# Patient Record
Sex: MALE | Race: BLACK OR AFRICAN AMERICAN | NOT HISPANIC OR LATINO | URBAN - METROPOLITAN AREA
[De-identification: names, ages, dates, MRNs, and addresses within clinical notes are randomized per-mention and may not be internally consistent; named-entity substitution may affect disease eponyms.]

---

## 2018-07-09 ENCOUNTER — EMERGENCY (EMERGENCY)
Facility: HOSPITAL | Age: 58
LOS: 0 days | Discharge: ANOTHER HOSPITAL | End: 2018-07-10
Attending: EMERGENCY MEDICINE
Payer: COMMERCIAL

## 2018-07-09 VITALS
TEMPERATURE: 99 F | RESPIRATION RATE: 18 BRPM | DIASTOLIC BLOOD PRESSURE: 82 MMHG | HEART RATE: 88 BPM | OXYGEN SATURATION: 99 % | HEIGHT: 73 IN | SYSTOLIC BLOOD PRESSURE: 137 MMHG

## 2018-07-09 DIAGNOSIS — M25.571 PAIN IN RIGHT ANKLE AND JOINTS OF RIGHT FOOT: ICD-10-CM

## 2018-07-09 DIAGNOSIS — I10 ESSENTIAL (PRIMARY) HYPERTENSION: ICD-10-CM

## 2018-07-09 DIAGNOSIS — Z98.890 OTHER SPECIFIED POSTPROCEDURAL STATES: ICD-10-CM

## 2018-07-09 PROCEDURE — 99285 EMERGENCY DEPT VISIT HI MDM: CPT

## 2018-07-09 NOTE — ED ADULT TRIAGE NOTE - CHIEF COMPLAINT QUOTE
pt complaining of right foot pain, swelling and drainage since this morning. pt status post right ankle fracture. pt has had a splint on his right ankle for 14 days.

## 2018-07-10 VITALS
SYSTOLIC BLOOD PRESSURE: 122 MMHG | DIASTOLIC BLOOD PRESSURE: 83 MMHG | RESPIRATION RATE: 18 BRPM | TEMPERATURE: 98 F | OXYGEN SATURATION: 98 % | HEART RATE: 98 BPM

## 2018-07-10 LAB
ALBUMIN SERPL ELPH-MCNC: 3.2 G/DL — LOW (ref 3.3–5)
ALP SERPL-CCNC: 51 U/L — SIGNIFICANT CHANGE UP (ref 40–120)
ALT FLD-CCNC: 21 U/L — SIGNIFICANT CHANGE UP (ref 12–78)
ANION GAP SERPL CALC-SCNC: 9 MMOL/L — SIGNIFICANT CHANGE UP (ref 5–17)
AST SERPL-CCNC: 19 U/L — SIGNIFICANT CHANGE UP (ref 15–37)
BASOPHILS # BLD AUTO: 0.03 K/UL — SIGNIFICANT CHANGE UP (ref 0–0.2)
BASOPHILS NFR BLD AUTO: 0.5 % — SIGNIFICANT CHANGE UP (ref 0–2)
BILIRUB SERPL-MCNC: 0.5 MG/DL — SIGNIFICANT CHANGE UP (ref 0.2–1.2)
BUN SERPL-MCNC: 17 MG/DL — SIGNIFICANT CHANGE UP (ref 7–23)
CALCIUM SERPL-MCNC: 9.7 MG/DL — SIGNIFICANT CHANGE UP (ref 8.5–10.1)
CHLORIDE SERPL-SCNC: 102 MMOL/L — SIGNIFICANT CHANGE UP (ref 96–108)
CO2 SERPL-SCNC: 26 MMOL/L — SIGNIFICANT CHANGE UP (ref 22–31)
CREAT SERPL-MCNC: 1.31 MG/DL — HIGH (ref 0.5–1.3)
CRP SERPL-MCNC: 6.08 MG/DL — HIGH (ref 0–0.4)
EOSINOPHIL # BLD AUTO: 0.08 K/UL — SIGNIFICANT CHANGE UP (ref 0–0.5)
EOSINOPHIL NFR BLD AUTO: 1.4 % — SIGNIFICANT CHANGE UP (ref 0–6)
ERYTHROCYTE [SEDIMENTATION RATE] IN BLOOD: 81 MM/HR — HIGH (ref 0–20)
GLUCOSE SERPL-MCNC: 105 MG/DL — HIGH (ref 70–99)
HCT VFR BLD CALC: 36.8 % — LOW (ref 39–50)
HGB BLD-MCNC: 12.1 G/DL — LOW (ref 13–17)
IMM GRANULOCYTES NFR BLD AUTO: 0.2 % — SIGNIFICANT CHANGE UP (ref 0–1.5)
LACTATE SERPL-SCNC: 1.3 MMOL/L — SIGNIFICANT CHANGE UP (ref 0.7–2)
LYMPHOCYTES # BLD AUTO: 1.57 K/UL — SIGNIFICANT CHANGE UP (ref 1–3.3)
LYMPHOCYTES # BLD AUTO: 28.3 % — SIGNIFICANT CHANGE UP (ref 13–44)
MCHC RBC-ENTMCNC: 31.7 PG — SIGNIFICANT CHANGE UP (ref 27–34)
MCHC RBC-ENTMCNC: 32.9 GM/DL — SIGNIFICANT CHANGE UP (ref 32–36)
MCV RBC AUTO: 96.3 FL — SIGNIFICANT CHANGE UP (ref 80–100)
MONOCYTES # BLD AUTO: 0.47 K/UL — SIGNIFICANT CHANGE UP (ref 0–0.9)
MONOCYTES NFR BLD AUTO: 8.5 % — SIGNIFICANT CHANGE UP (ref 2–14)
NEUTROPHILS # BLD AUTO: 3.39 K/UL — SIGNIFICANT CHANGE UP (ref 1.8–7.4)
NEUTROPHILS NFR BLD AUTO: 61.1 % — SIGNIFICANT CHANGE UP (ref 43–77)
NRBC # BLD: 0 /100 WBCS — SIGNIFICANT CHANGE UP (ref 0–0)
PLATELET # BLD AUTO: 510 K/UL — HIGH (ref 150–400)
POTASSIUM SERPL-MCNC: 3.7 MMOL/L — SIGNIFICANT CHANGE UP (ref 3.5–5.3)
POTASSIUM SERPL-SCNC: 3.7 MMOL/L — SIGNIFICANT CHANGE UP (ref 3.5–5.3)
PROT SERPL-MCNC: 10.1 GM/DL — HIGH (ref 6–8.3)
RBC # BLD: 3.82 M/UL — LOW (ref 4.2–5.8)
RBC # FLD: 13.9 % — SIGNIFICANT CHANGE UP (ref 10.3–14.5)
SODIUM SERPL-SCNC: 137 MMOL/L — SIGNIFICANT CHANGE UP (ref 135–145)
WBC # BLD: 5.55 K/UL — SIGNIFICANT CHANGE UP (ref 3.8–10.5)
WBC # FLD AUTO: 5.55 K/UL — SIGNIFICANT CHANGE UP (ref 3.8–10.5)

## 2018-07-10 PROCEDURE — 73610 X-RAY EXAM OF ANKLE: CPT | Mod: 26,76,RT

## 2018-07-10 RX ORDER — OXYCODONE HYDROCHLORIDE 5 MG/1
5 TABLET ORAL ONCE
Qty: 0 | Refills: 0 | Status: DISCONTINUED | OUTPATIENT
Start: 2018-07-10 | End: 2018-07-10

## 2018-07-10 RX ADMIN — OXYCODONE HYDROCHLORIDE 5 MILLIGRAM(S): 5 TABLET ORAL at 05:06
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
## 2018-07-10 NOTE — ED PROVIDER NOTE - MEDICAL DECISION MAKING DETAILS
Splint removed and dressing completely saturated with purulent drainage, foul smelling. patient with elevated ESR. Spoke with Dr. Gibbs who accepted the patient for transfer to [facility] for ER to ER transfer. Splint removed(by Ortho resident) and dressing completely saturated with purulent drainage, foul smelling. patient with elevated ESR. Spoke with Dr. Gibbs who accepted the patient for transfer to [facility] for ER to ER transfer.
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
## 2018-07-10 NOTE — ED ADULT NURSE NOTE - OBJECTIVE STATEMENT
Patient to the ER with complaint of R foot pain, had surgery for broken ankle 2 weeks ago has returned to the ED with increasing swelling pain and drainage from incision site.  Patient denies fever chill.

## 2018-07-10 NOTE — ED PROVIDER NOTE - PHYSICAL EXAMINATION
General:     NAD, well-nourished, well-appearing  Head:     NC/AT, EOMI, oral mucosa moist  Neck:     trachea midline  Lungs:     CTA b/l, no w/r/r  CVS:     S1S2, RRR, no m/g/r  Abd:     +BS, s/nt/nd, no organomegaly  Ext:    R LE:  edema over distal extremity with erythema, after taking down split and dressing, large amt of purulent discharge flowing out of medial and lateral wounds with surrouding erythema.  Neuro: AAOx3, no sensory/motor deficits

## 2018-07-10 NOTE — CONSULT NOTE ADULT - ASSESSMENT
A/P: 58y Male with R s/p ORIF for open right ankle fracture with clinical signs of surgical site infection  No current systemic signs of infection, afebrile, vitals stable  Pain control  NWB RLE in splint, keep c/d/I, cane/crutches/walker as needed  Ice/elevation  NPO for possible OR  IVF while NPO  Hold antibiotics for intraoperative cultures  Hold chemical AC for possible OR  Possible need for surgical intervention in for formal washout of surgical sites discussed, all questions answered  Discussed case with ED attending, patient's orthopedic attending works out of [facility], ED attending to arrange for transfer to [facility] for further care and treatment of infection  Ortho stable for transfer A/P: 58y Male with R s/p ORIF for open right ankle fracture with clinical signs of surgical site infection  No current systemic signs of infection, afebrile, vitals stable  Pain control  NWB RLE in splint, keep c/d/I, cane/crutches/walker as needed  Ice/elevation  NPO for possible OR  IVF while NPO  Hold antibiotics for intraoperative cultures  Hold chemical AC for possible OR  Possible need for surgical intervention in for formal washout of surgical sites discussed, all questions answered  Discussed case with ED attending, patient's orthopedic attending works out of [facility], ED attending to arrange for transfer to [facility] for further care and treatment of infection  Ortho stable for transfer  Will d/w attending A/P: 58y Male with R s/p ORIF for open right ankle fracture with clinical signs of surgical site infection  No current systemic signs of infection, afebrile, vitals stable  Pain control  NWB RLE in splint, keep c/d/I, cane/crutches/walker as needed  Ice/elevation  NPO for possible OR  IVF while NPO  Hold antibiotics for intraoperative cultures  Hold chemical AC for possible OR  Possible need for surgical intervention in for formal washout of surgical sites discussed, all questions answered  Discussed case with ED attending, patient's orthopedic attending works out of [facility], ED attending to arrange for transfer to [facility] for further care and treatment of [facility] surgical site infection by primary surgeon  Ortho stable for transfer  Will d/w attending

## 2018-07-15 LAB
CULTURE RESULTS: SIGNIFICANT CHANGE UP
CULTURE RESULTS: SIGNIFICANT CHANGE UP
SPECIMEN SOURCE: SIGNIFICANT CHANGE UP
SPECIMEN SOURCE: SIGNIFICANT CHANGE UP

## 2020-03-09 ENCOUNTER — EMERGENCY (EMERGENCY)
Facility: HOSPITAL | Age: 60
LOS: 0 days | Discharge: HOME | End: 2020-03-09
Attending: EMERGENCY MEDICINE
Payer: COMMERCIAL

## 2020-03-09 VITALS
RESPIRATION RATE: 18 BRPM | OXYGEN SATURATION: 98 % | TEMPERATURE: 98 F | SYSTOLIC BLOOD PRESSURE: 117 MMHG | HEART RATE: 78 BPM | DIASTOLIC BLOOD PRESSURE: 79 MMHG

## 2020-03-09 VITALS
HEART RATE: 93 BPM | OXYGEN SATURATION: 98 % | TEMPERATURE: 99 F | WEIGHT: 240.08 LBS | DIASTOLIC BLOOD PRESSURE: 89 MMHG | RESPIRATION RATE: 17 BRPM | SYSTOLIC BLOOD PRESSURE: 119 MMHG

## 2020-03-09 DIAGNOSIS — I10 ESSENTIAL (PRIMARY) HYPERTENSION: ICD-10-CM

## 2020-03-09 DIAGNOSIS — R07.1 CHEST PAIN ON BREATHING: ICD-10-CM

## 2020-03-09 DIAGNOSIS — R07.9 CHEST PAIN, UNSPECIFIED: ICD-10-CM

## 2020-03-09 LAB
ALBUMIN SERPL ELPH-MCNC: 3.9 G/DL — SIGNIFICANT CHANGE UP (ref 3.3–5)
ALP SERPL-CCNC: 45 U/L — SIGNIFICANT CHANGE UP (ref 40–120)
ALT FLD-CCNC: 23 U/L — SIGNIFICANT CHANGE UP (ref 12–78)
ANION GAP SERPL CALC-SCNC: 5 MMOL/L — SIGNIFICANT CHANGE UP (ref 5–17)
APTT BLD: 30.8 SEC — SIGNIFICANT CHANGE UP (ref 27.5–36.3)
AST SERPL-CCNC: 19 U/L — SIGNIFICANT CHANGE UP (ref 15–37)
BASOPHILS # BLD AUTO: 0.03 K/UL — SIGNIFICANT CHANGE UP (ref 0–0.2)
BASOPHILS NFR BLD AUTO: 0.9 % — SIGNIFICANT CHANGE UP (ref 0–2)
BILIRUB SERPL-MCNC: 1.2 MG/DL — SIGNIFICANT CHANGE UP (ref 0.2–1.2)
BUN SERPL-MCNC: 13 MG/DL — SIGNIFICANT CHANGE UP (ref 7–23)
CALCIUM SERPL-MCNC: 9.5 MG/DL — SIGNIFICANT CHANGE UP (ref 8.5–10.1)
CHLORIDE SERPL-SCNC: 108 MMOL/L — SIGNIFICANT CHANGE UP (ref 96–108)
CO2 SERPL-SCNC: 26 MMOL/L — SIGNIFICANT CHANGE UP (ref 22–31)
CREAT SERPL-MCNC: 1.13 MG/DL — SIGNIFICANT CHANGE UP (ref 0.5–1.3)
D DIMER BLD IA.RAPID-MCNC: 482 NG/ML DDU — HIGH
EOSINOPHIL # BLD AUTO: 0.06 K/UL — SIGNIFICANT CHANGE UP (ref 0–0.5)
EOSINOPHIL NFR BLD AUTO: 1.8 % — SIGNIFICANT CHANGE UP (ref 0–6)
GLUCOSE SERPL-MCNC: 107 MG/DL — HIGH (ref 70–99)
HCT VFR BLD CALC: 42.5 % — SIGNIFICANT CHANGE UP (ref 39–50)
HGB BLD-MCNC: 14 G/DL — SIGNIFICANT CHANGE UP (ref 13–17)
IMM GRANULOCYTES NFR BLD AUTO: 0.3 % — SIGNIFICANT CHANGE UP (ref 0–1.5)
INR BLD: 1.03 RATIO — SIGNIFICANT CHANGE UP (ref 0.88–1.16)
LYMPHOCYTES # BLD AUTO: 1.16 K/UL — SIGNIFICANT CHANGE UP (ref 1–3.3)
LYMPHOCYTES # BLD AUTO: 34.9 % — SIGNIFICANT CHANGE UP (ref 13–44)
MCHC RBC-ENTMCNC: 31.5 PG — SIGNIFICANT CHANGE UP (ref 27–34)
MCHC RBC-ENTMCNC: 32.9 GM/DL — SIGNIFICANT CHANGE UP (ref 32–36)
MCV RBC AUTO: 95.7 FL — SIGNIFICANT CHANGE UP (ref 80–100)
MONOCYTES # BLD AUTO: 0.44 K/UL — SIGNIFICANT CHANGE UP (ref 0–0.9)
MONOCYTES NFR BLD AUTO: 13.3 % — SIGNIFICANT CHANGE UP (ref 2–14)
NEUTROPHILS # BLD AUTO: 1.62 K/UL — LOW (ref 1.8–7.4)
NEUTROPHILS NFR BLD AUTO: 48.8 % — SIGNIFICANT CHANGE UP (ref 43–77)
NRBC # BLD: 0 /100 WBCS — SIGNIFICANT CHANGE UP (ref 0–0)
PLATELET # BLD AUTO: 180 K/UL — SIGNIFICANT CHANGE UP (ref 150–400)
POTASSIUM SERPL-MCNC: 4.2 MMOL/L — SIGNIFICANT CHANGE UP (ref 3.5–5.3)
POTASSIUM SERPL-SCNC: 4.2 MMOL/L — SIGNIFICANT CHANGE UP (ref 3.5–5.3)
PROT SERPL-MCNC: 8.4 GM/DL — HIGH (ref 6–8.3)
PROTHROM AB SERPL-ACNC: 11.6 SEC — SIGNIFICANT CHANGE UP (ref 10–12.9)
RBC # BLD: 4.44 M/UL — SIGNIFICANT CHANGE UP (ref 4.2–5.8)
RBC # FLD: 14.1 % — SIGNIFICANT CHANGE UP (ref 10.3–14.5)
SODIUM SERPL-SCNC: 139 MMOL/L — SIGNIFICANT CHANGE UP (ref 135–145)
TROPONIN I SERPL-MCNC: <.015 NG/ML — SIGNIFICANT CHANGE UP (ref 0.01–0.04)
WBC # BLD: 3.32 K/UL — LOW (ref 3.8–10.5)
WBC # FLD AUTO: 3.32 K/UL — LOW (ref 3.8–10.5)

## 2020-03-09 PROCEDURE — 93010 ELECTROCARDIOGRAM REPORT: CPT

## 2020-03-09 PROCEDURE — 71275 CT ANGIOGRAPHY CHEST: CPT | Mod: 26

## 2020-03-09 PROCEDURE — 99285 EMERGENCY DEPT VISIT HI MDM: CPT

## 2020-03-09 PROCEDURE — 71045 X-RAY EXAM CHEST 1 VIEW: CPT | Mod: 26

## 2020-03-09 RX ORDER — LOSARTAN POTASSIUM 100 MG/1
1 TABLET, FILM COATED ORAL
Qty: 0 | Refills: 0 | DISCHARGE

## 2020-03-09 NOTE — ED PROVIDER NOTE - OBJECTIVE STATEMENT
59 year old male HTN hx presenting to ED due to chest discomfort -worse with respirations x 3 days. No fever no cough or chest trauma noted. Pt is nonsmoker and has not had any cardiac issues in past.

## 2020-03-09 NOTE — ED ADULT NURSE REASSESSMENT NOTE - NS ED NURSE REASSESS COMMENT FT1
ptr at CT scan still, pending arrival and reeval
Pt resting comfortably, no distress noted, resp even and unlabored, pt updated about the plan of care, pt states no pain, no discomfort and does not appear to be in any distress. Pt proficient in the plan of care as evidenced by successful patient teach back.   pt back from Ct scan
pt sent to CT scan

## 2020-03-09 NOTE — ED PROVIDER NOTE - PATIENT PORTAL LINK FT
You can access the FollowMyHealth Patient Portal offered by [facility] by registering at the following website: [email or web address]. By joining [facility] [employer]’s FollowMyHealth portal, you will also be able to view your health information using other applications (apps) compatible with our system.

## 2020-03-09 NOTE — ED ADULT NURSE NOTE - OBJECTIVE STATEMENT
" Im having chest discomfort a tingling sensation in my chest" Pt complains of flu like symptoms such as runny nose this morning, complains of chest discomfort since friday on and off, pt takes a combination pill for BP but does not remember the name.

## 2021-08-24 ENCOUNTER — INPATIENT (INPATIENT)
Facility: HOSPITAL | Age: 61
LOS: 1 days | Discharge: HOME | End: 2021-08-26
Attending: INTERNAL MEDICINE | Admitting: INTERNAL MEDICINE
Payer: COMMERCIAL

## 2021-08-24 VITALS
WEIGHT: 240.08 LBS | HEART RATE: 108 BPM | TEMPERATURE: 99 F | RESPIRATION RATE: 32 BRPM | HEIGHT: 73 IN | OXYGEN SATURATION: 96 % | DIASTOLIC BLOOD PRESSURE: 77 MMHG | SYSTOLIC BLOOD PRESSURE: 115 MMHG

## 2021-08-24 LAB
BASOPHILS # BLD AUTO: 0.03 K/UL — SIGNIFICANT CHANGE UP (ref 0–0.2)
BASOPHILS NFR BLD AUTO: 0.3 % — SIGNIFICANT CHANGE UP (ref 0–2)
EOSINOPHIL # BLD AUTO: 0 K/UL — SIGNIFICANT CHANGE UP (ref 0–0.5)
EOSINOPHIL NFR BLD AUTO: 0 % — SIGNIFICANT CHANGE UP (ref 0–6)
HCT VFR BLD CALC: 42.3 % — SIGNIFICANT CHANGE UP (ref 39–50)
HGB BLD-MCNC: 13.9 G/DL — SIGNIFICANT CHANGE UP (ref 13–17)
IMM GRANULOCYTES NFR BLD AUTO: 0.4 % — SIGNIFICANT CHANGE UP (ref 0–1.5)
LACTATE SERPL-SCNC: 1.7 MMOL/L — SIGNIFICANT CHANGE UP (ref 0.7–2)
LYMPHOCYTES # BLD AUTO: 0.82 K/UL — LOW (ref 1–3.3)
LYMPHOCYTES # BLD AUTO: 7 % — LOW (ref 13–44)
MCHC RBC-ENTMCNC: 31 PG — SIGNIFICANT CHANGE UP (ref 27–34)
MCHC RBC-ENTMCNC: 32.9 GM/DL — SIGNIFICANT CHANGE UP (ref 32–36)
MCV RBC AUTO: 94.4 FL — SIGNIFICANT CHANGE UP (ref 80–100)
MONOCYTES # BLD AUTO: 0.86 K/UL — SIGNIFICANT CHANGE UP (ref 0–0.9)
MONOCYTES NFR BLD AUTO: 7.4 % — SIGNIFICANT CHANGE UP (ref 2–14)
NEUTROPHILS # BLD AUTO: 9.88 K/UL — HIGH (ref 1.8–7.4)
NEUTROPHILS NFR BLD AUTO: 84.9 % — HIGH (ref 43–77)
NRBC # BLD: 0 /100 WBCS — SIGNIFICANT CHANGE UP (ref 0–0)
PLATELET # BLD AUTO: 185 K/UL — SIGNIFICANT CHANGE UP (ref 150–400)
RBC # BLD: 4.48 M/UL — SIGNIFICANT CHANGE UP (ref 4.2–5.8)
RBC # FLD: 15.9 % — HIGH (ref 10.3–14.5)
WBC # BLD: 11.64 K/UL — HIGH (ref 3.8–10.5)
WBC # FLD AUTO: 11.64 K/UL — HIGH (ref 3.8–10.5)

## 2021-08-24 PROCEDURE — 99285 EMERGENCY DEPT VISIT HI MDM: CPT

## 2021-08-24 RX ORDER — SODIUM CHLORIDE 9 MG/ML
1000 INJECTION, SOLUTION INTRAVENOUS ONCE
Refills: 0 | Status: COMPLETED | OUTPATIENT
Start: 2021-08-24 | End: 2021-08-24

## 2021-08-24 RX ORDER — CEFEPIME 1 G/1
1000 INJECTION, POWDER, FOR SOLUTION INTRAMUSCULAR; INTRAVENOUS ONCE
Refills: 0 | Status: COMPLETED | OUTPATIENT
Start: 2021-08-24 | End: 2021-08-24

## 2021-08-24 RX ORDER — VANCOMYCIN HCL 1 G
1000 VIAL (EA) INTRAVENOUS ONCE
Refills: 0 | Status: COMPLETED | OUTPATIENT
Start: 2021-08-24 | End: 2021-08-24

## 2021-08-24 NOTE — ED ADULT NURSE NOTE - OBJECTIVE STATEMENT
Patient c/o redness, itching and swelling to abdominal area and around left flank area X 1 day.  Previously diagnosed with cellulitis last year. PMH HTN

## 2021-08-24 NOTE — ED ADULT TRIAGE NOTE - CHIEF COMPLAINT QUOTE
redness, itching and swelling to skin on belly and back X 1 day.  Previously diagnosed with cellulitis last year, appears to be the same skin infection

## 2021-08-25 DIAGNOSIS — E78.5 HYPERLIPIDEMIA, UNSPECIFIED: ICD-10-CM

## 2021-08-25 DIAGNOSIS — I10 ESSENTIAL (PRIMARY) HYPERTENSION: ICD-10-CM

## 2021-08-25 DIAGNOSIS — Z29.9 ENCOUNTER FOR PROPHYLACTIC MEASURES, UNSPECIFIED: ICD-10-CM

## 2021-08-25 DIAGNOSIS — N17.9 ACUTE KIDNEY FAILURE, UNSPECIFIED: ICD-10-CM

## 2021-08-25 DIAGNOSIS — L03.90 CELLULITIS, UNSPECIFIED: ICD-10-CM

## 2021-08-25 LAB
ALBUMIN SERPL ELPH-MCNC: 3.6 G/DL — SIGNIFICANT CHANGE UP (ref 3.3–5)
ALP SERPL-CCNC: 38 U/L — LOW (ref 40–120)
ALT FLD-CCNC: 20 U/L — SIGNIFICANT CHANGE UP (ref 12–78)
ANION GAP SERPL CALC-SCNC: 6 MMOL/L — SIGNIFICANT CHANGE UP (ref 5–17)
APTT BLD: 28.9 SEC — SIGNIFICANT CHANGE UP (ref 27.5–35.5)
AST SERPL-CCNC: 18 U/L — SIGNIFICANT CHANGE UP (ref 15–37)
B BURGDOR C6 AB SER-ACNC: NEGATIVE — SIGNIFICANT CHANGE UP
B BURGDOR IGG+IGM SER-ACNC: 0.11 INDEX — SIGNIFICANT CHANGE UP (ref 0.01–0.89)
BILIRUB SERPL-MCNC: 2 MG/DL — HIGH (ref 0.2–1.2)
BUN SERPL-MCNC: 14 MG/DL — SIGNIFICANT CHANGE UP (ref 7–23)
CALCIUM SERPL-MCNC: 9.1 MG/DL — SIGNIFICANT CHANGE UP (ref 8.5–10.1)
CHLORIDE SERPL-SCNC: 108 MMOL/L — SIGNIFICANT CHANGE UP (ref 96–108)
CO2 SERPL-SCNC: 25 MMOL/L — SIGNIFICANT CHANGE UP (ref 22–31)
CREAT SERPL-MCNC: 1.52 MG/DL — HIGH (ref 0.5–1.3)
ERYTHROCYTE [SEDIMENTATION RATE] IN BLOOD: 3 MM/HR — SIGNIFICANT CHANGE UP (ref 0–20)
FLUAV AG NPH QL: SIGNIFICANT CHANGE UP
FLUBV AG NPH QL: SIGNIFICANT CHANGE UP
GLUCOSE SERPL-MCNC: 107 MG/DL — HIGH (ref 70–99)
INR BLD: 1.09 RATIO — SIGNIFICANT CHANGE UP (ref 0.88–1.16)
POTASSIUM SERPL-MCNC: 4.2 MMOL/L — SIGNIFICANT CHANGE UP (ref 3.5–5.3)
POTASSIUM SERPL-SCNC: 4.2 MMOL/L — SIGNIFICANT CHANGE UP (ref 3.5–5.3)
PROCALCITONIN SERPL-MCNC: 3.65 NG/ML — HIGH (ref 0.02–0.1)
PROT SERPL-MCNC: 8 GM/DL — SIGNIFICANT CHANGE UP (ref 6–8.3)
PROTHROM AB SERPL-ACNC: 12.6 SEC — SIGNIFICANT CHANGE UP (ref 10.6–13.6)
SARS-COV-2 RNA SPEC QL NAA+PROBE: SIGNIFICANT CHANGE UP
SODIUM SERPL-SCNC: 139 MMOL/L — SIGNIFICANT CHANGE UP (ref 135–145)

## 2021-08-25 PROCEDURE — 99222 1ST HOSP IP/OBS MODERATE 55: CPT

## 2021-08-25 PROCEDURE — 99254 IP/OBS CNSLTJ NEW/EST MOD 60: CPT

## 2021-08-25 PROCEDURE — 74177 CT ABD & PELVIS W/CONTRAST: CPT | Mod: 26,MA

## 2021-08-25 RX ORDER — SODIUM CHLORIDE 9 MG/ML
1000 INJECTION, SOLUTION INTRAVENOUS
Refills: 0 | Status: COMPLETED | OUTPATIENT
Start: 2021-08-25 | End: 2021-08-25

## 2021-08-25 RX ORDER — ENOXAPARIN SODIUM 100 MG/ML
40 INJECTION SUBCUTANEOUS DAILY
Refills: 0 | Status: DISCONTINUED | OUTPATIENT
Start: 2021-08-25 | End: 2021-08-26

## 2021-08-25 RX ORDER — ATORVASTATIN CALCIUM 80 MG/1
40 TABLET, FILM COATED ORAL AT BEDTIME
Refills: 0 | Status: DISCONTINUED | OUTPATIENT
Start: 2021-08-25 | End: 2021-08-26

## 2021-08-25 RX ORDER — CEFAZOLIN SODIUM 1 G
2000 VIAL (EA) INJECTION EVERY 8 HOURS
Refills: 0 | Status: DISCONTINUED | OUTPATIENT
Start: 2021-08-25 | End: 2021-08-26

## 2021-08-25 RX ORDER — ACETAMINOPHEN 500 MG
650 TABLET ORAL EVERY 4 HOURS
Refills: 0 | Status: DISCONTINUED | OUTPATIENT
Start: 2021-08-25 | End: 2021-08-26

## 2021-08-25 RX ORDER — LOSARTAN POTASSIUM 100 MG/1
50 TABLET, FILM COATED ORAL DAILY
Refills: 0 | Status: DISCONTINUED | OUTPATIENT
Start: 2021-08-26 | End: 2021-08-26

## 2021-08-25 RX ORDER — CEFEPIME 1 G/1
1000 INJECTION, POWDER, FOR SOLUTION INTRAMUSCULAR; INTRAVENOUS EVERY 12 HOURS
Refills: 0 | Status: DISCONTINUED | OUTPATIENT
Start: 2021-08-25 | End: 2021-08-25

## 2021-08-25 RX ORDER — SODIUM CHLORIDE 9 MG/ML
1000 INJECTION INTRAMUSCULAR; INTRAVENOUS; SUBCUTANEOUS
Refills: 0 | Status: DISCONTINUED | OUTPATIENT
Start: 2021-08-25 | End: 2021-08-26

## 2021-08-25 RX ORDER — AMLODIPINE BESYLATE 2.5 MG/1
10 TABLET ORAL DAILY
Refills: 0 | Status: DISCONTINUED | OUTPATIENT
Start: 2021-08-25 | End: 2021-08-26

## 2021-08-25 RX ORDER — LOSARTAN POTASSIUM 100 MG/1
100 TABLET, FILM COATED ORAL DAILY
Refills: 0 | Status: DISCONTINUED | OUTPATIENT
Start: 2021-08-25 | End: 2021-08-25

## 2021-08-25 RX ADMIN — CEFEPIME 100 MILLIGRAM(S): 1 INJECTION, POWDER, FOR SOLUTION INTRAMUSCULAR; INTRAVENOUS at 00:49

## 2021-08-25 RX ADMIN — CEFEPIME 100 MILLIGRAM(S): 1 INJECTION, POWDER, FOR SOLUTION INTRAMUSCULAR; INTRAVENOUS at 17:20

## 2021-08-25 RX ADMIN — CEFEPIME 100 MILLIGRAM(S): 1 INJECTION, POWDER, FOR SOLUTION INTRAMUSCULAR; INTRAVENOUS at 06:35

## 2021-08-25 RX ADMIN — SODIUM CHLORIDE 80 MILLILITER(S): 9 INJECTION, SOLUTION INTRAVENOUS at 11:05

## 2021-08-25 RX ADMIN — Medication 100 MILLIGRAM(S): at 22:15

## 2021-08-25 RX ADMIN — Medication 650 MILLIGRAM(S): at 17:20

## 2021-08-25 RX ADMIN — Medication 1000 MILLIGRAM(S): at 02:18

## 2021-08-25 RX ADMIN — SODIUM CHLORIDE 1000 MILLILITER(S): 9 INJECTION, SOLUTION INTRAVENOUS at 00:59

## 2021-08-25 RX ADMIN — ATORVASTATIN CALCIUM 40 MILLIGRAM(S): 80 TABLET, FILM COATED ORAL at 21:47

## 2021-08-25 RX ADMIN — Medication 250 MILLIGRAM(S): at 01:00

## 2021-08-25 RX ADMIN — CEFEPIME 1000 MILLIGRAM(S): 1 INJECTION, POWDER, FOR SOLUTION INTRAMUSCULAR; INTRAVENOUS at 02:18

## 2021-08-25 RX ADMIN — LOSARTAN POTASSIUM 100 MILLIGRAM(S): 100 TABLET, FILM COATED ORAL at 06:35

## 2021-08-25 RX ADMIN — ENOXAPARIN SODIUM 40 MILLIGRAM(S): 100 INJECTION SUBCUTANEOUS at 13:00

## 2021-08-25 RX ADMIN — SODIUM CHLORIDE 60 MILLILITER(S): 9 INJECTION INTRAMUSCULAR; INTRAVENOUS; SUBCUTANEOUS at 13:11

## 2021-08-25 RX ADMIN — AMLODIPINE BESYLATE 10 MILLIGRAM(S): 2.5 TABLET ORAL at 06:35

## 2021-08-25 NOTE — H&P ADULT - ASSESSMENT
Patient is a 61M with a PMH of HTN, HLD who presents to the ED for rash on his abdomen.  Patient states the was in his usual state of health until yesterday afternoon when he had subjective chills and noted increased erythema and warmth on his upper and lower abdomen radiating to his L flank.  Patient has no other complaints.  States that he had a similar rash early 2021 which he got treated at [facility].  Denies allergies, denies pruritis.  Otherwise, patient has no other complaints.  Reports getting plastic surgery on his chest last year.  Vitals stable, labs show mild leukocytosis and elevated creatinine.     IMPROVE VTE Individual Risk Assessment          RISK                                                          Points  [  ] Previous VTE                                                3  [  ] Thrombophilia                                             2  [  ] Lower limb paralysis                                    2        (unable to hold up >15 seconds)    [  ] Current Cancer                                             2         (within 6 months)  [  ] Immobilization > 24 hrs                              1  [  ] ICU/CCU stay > 24 hours                            1  [  ] Age > 60                                                    1    IMPROVE VTE Score - 1

## 2021-08-25 NOTE — CONSULT NOTE ADULT - SUBJECTIVE AND OBJECTIVE BOX
YANELI BARRETT  MRN-[id number]        Patient is a 61y old  Male who presents with a chief complaint of cellulitis (25 Aug 2021 05:36)      HPI:  Patient is a 61M with a PMH of HTN, HLD who presents to the ED for rash on his abdomen.  Patient states the was in his usual state of health until yesterday afternoon when he had subjective chills and noted increased erythema and warmth on his upper and lower abdomen radiating to his L flank.  Patient has no other complaints.  States that he had a similar rash early 2021 which he got treated at [facility].  Denies allergies, denies pruritis.  Otherwise, patient has no other complaints.  Reports getting plastic surgery on his chest last year.  Vitals stable, labs show mild leukocytosis and elevated creatinine.  (25 Aug 2021 05:36)    Erythema across whole abdomen and is warm. pt states he had this last year and was treated inpatient at [facility]. Pt also had cellulitis in right ankle many years ago      ID consulted for workup and antibiotic management     PAST MEDICAL & SURGICAL HISTORY:  HTN (hypertension)    No significant past surgical history        Allergies  No Known Allergies        ANTIMICROBIALS:  cefepime   IVPB 1000 every 12 hours      MEDICATIONS  (STANDING):  cefepime   IVPB   100 mL/Hr IV Intermittent (08-25-21 @ 00:49)    cefepime   IVPB   100 mL/Hr IV Intermittent (08-25-21 @ 06:35)    vancomycin  IVPB.   250 mL/Hr IV Intermittent (08-25-21 @ 01:00)        OTHER MEDS: MEDICATIONS  (STANDING):  amLODIPine   Tablet 10 daily  atorvastatin 40 at bedtime  enoxaparin Injectable 40 daily  losartan 100 daily      SOCIAL HISTORY:       FAMILY HISTORY:  FH: HTN (hypertension)  Mother had aortic dissection        REVIEW OF SYSTEMS  [  ] ROS unobtainable because:    [ x ] All other systems negative except as noted below:	    Constitutional:  [ ] fever [x ] chills  [ ] weight loss  [ ] weakness  Skin:  [x ] rash [ ] phlebitis	  Eyes: [ ] icterus [ ] pain  [ ] discharge	  ENMT: [ ] sore throat  [ ] thrush [ ] ulcers [ ] exudates  Respiratory: [ ] dyspnea [ ] hemoptysis [ ] cough [ ] sputum	  Cardiovascular:  [ ] chest pain [ ] palpitations [ ] edema	  Gastrointestinal:  [ ] nausea [ ] vomiting [ ] diarrhea [ ] constipation [ ] pain	  Genitourinary:  [ ] dysuria [ ] frequency [ ] hematuria [ ] discharge [x ] flank pain  [ ] incontinence  Musculoskeletal:  [ ] myalgias [ ] arthralgias [ ] arthritis  [ ] back pain  Neurological:  [ ] headache [ ] seizures  [ ] confusion/altered mental status  Psychiatric:  [ ] anxiety [ ] depression	  Hematology/Lymphatics:  [ ] lymphadenopathy  Endocrine:  [ ] adrenal [ ] thyroid  Allergic/Immunologic:	 [ ] transplant [ ] seasonal    Vital Signs Last 24 Hrs  T(F): 97.7 (08-25-21 @ 09:34), Max: 99 (08-24-21 @ 22:13)    Vital Signs Last 24 Hrs  HR: 95 (08-25-21 @ 09:34) (90 - 108)  BP: 118/71 (08-25-21 @ 09:34) (115/77 - 128/80)  RR: 18 (08-25-21 @ 09:34)  SpO2: 95% (08-25-21 @ 09:34) (95% - 99%)  Wt(kg): --    PHYSICAL EXAM:  Constitutional: non-toxic, no distress  HEAD/EYES: anicteric, no conjunctival injection  ENT:  supple, no thrush  Cardiovascular:   normal S1, S2, no murmur, no edema  Respiratory:  clear BS bilaterally, no wheezes, no rales  GI:  soft, non-tender, normal bowel sounds, Erythema present across the whole abdomen, that is warm to touch  :  no urbano, no CVA tenderness  Musculoskeletal:  no synovitis, normal ROM  Neurologic: awake and alert, normal strength, no focal findings  Skin:  no rash, no erythema, no phlebitis  Heme/Onc: no lymphadenopathy   Psychiatric:  awake, alert, appropriate mood          WBC Count: 11.64 K/uL (08-24 @ 23:15)                            13.9   11.64 )-----------( 185      ( 24 Aug 2021 23:15 )             42.3       08-24    139  |  108  |  14  ----------------------------<  107<H>  4.2   |  25  |  1.52<H>    Ca    9.1      24 Aug 2021 23:15    TPro  8.0  /  Alb  3.6  /  TBili  2.0<H>  /  DBili  x   /  AST  18  /  ALT  20  /  AlkPhos  38<L>  08-24      Creatinine Trend: 1.52<--        MICROBIOLOGY:          v                    Procalcitonin, Serum: 3.65 (08-25-21 @ 09:46)      RADIOLOGY:    EXAM: CT ABDOMEN AND PELVIS IC      PROCEDURE DATE: 08/25/2021        INTERPRETATION: CLINICAL INFORMATION: Abdominal wall swelling and erythema.    COMPARISON: None.    CONTRAST/COMPLICATIONS:  IV Contrast: Omnipaque 350 90 cc administered 10 cc discarded  Oral Contrast: NONE  Complications: None reported at time of study completion    PROCEDURE:  CT of the Abdomen and Pelvis was performed.  Sagittal and coronal reformats were performed.    FINDINGS:  LOWER CHEST: Within normal limits.    LIVER: Segment 8/5 cyst.  BILE DUCTS: Normal caliber.  GALLBLADDER: Within normal limits.  SPLEEN: Within normal limits.  PANCREAS: Within normal limits.  ADRENALS: Within normal limits.  KIDNEYS/URETERS: Within normal limits.    BLADDER: Within normal limits.  REPRODUCTIVE ORGANS: Prostate within normal limits.    BOWEL: No bowel obstruction. Appendix is normal.  PERITONEUM: No ascites.  VESSELS: Within normal limits.  RETROPERITONEUM/LYMPH NODES: No lymphadenopathy.  ABDOMINAL WALL: Anterior abdominal wall subcutaneous edema. Linear opacities along the flanks bilaterally may represent scarring. There is skin thickening over the left flank. No subcutaneous gas or fluid collection.  BONES: Degenerative changes.    IMPRESSION:  Mild ventral abdominal wall edema with right ventral abdominal wall skin thickening and left flank Please correlate for signs and symptoms of cellulitis.        --- End of Report ---         YANELI BARRETT  MRN-[id number]        Patient is a 61y old  Male who presents with a chief complaint of cellulitis (25 Aug 2021 05:36)      HPI:  Patient is a 61M with a PMH of HTN, HLD who presents to the ED for rash on his abdomen.  Patient states the was in his usual state of health until yesterday afternoon when he had subjective chills and noted increased erythema and warmth on his upper and lower abdomen radiating to his L flank.  Patient has no other complaints.  States that he had a similar rash early 2021 which he got treated at [facility].  Denies allergies, denies pruritis.  Otherwise, patient has no other complaints.  Reports getting plastic surgery on his chest last year.  Vitals stable, labs show mild leukocytosis and elevated creatinine.  (25 Aug 2021 05:36)    Erythema across whole abdomen and is warm. pt states he had this last year and was treated inpatient at [facility]. Pt also had cellulitis in right ankle many years ago      ID consulted for workup and antibiotic management     PAST MEDICAL & SURGICAL HISTORY:  HTN (hypertension)    No significant past surgical history        Allergies  No Known Allergies        ANTIMICROBIALS:  cefepime   IVPB 1000 every 12 hours      MEDICATIONS  (STANDING):  cefepime   IVPB   100 mL/Hr IV Intermittent (08-25-21 @ 00:49)    cefepime   IVPB   100 mL/Hr IV Intermittent (08-25-21 @ 06:35)    vancomycin  IVPB.   250 mL/Hr IV Intermittent (08-25-21 @ 01:00)        OTHER MEDS: MEDICATIONS  (STANDING):  amLODIPine   Tablet 10 daily  atorvastatin 40 at bedtime  enoxaparin Injectable 40 daily  losartan 100 daily      SOCIAL HISTORY:     denies toxic habits    FAMILY HISTORY:  FH: HTN (hypertension)  Mother had aortic dissection        REVIEW OF SYSTEMS  [  ] ROS unobtainable because:    [ x ] All other systems negative except as noted below:	    Constitutional:  [ ] fever [x ] chills  [ ] weight loss  [ ] weakness  Skin:  [x ] rash [ ] phlebitis	  Eyes: [ ] icterus [ ] pain  [ ] discharge	  ENMT: [ ] sore throat  [ ] thrush [ ] ulcers [ ] exudates  Respiratory: [ ] dyspnea [ ] hemoptysis [ ] cough [ ] sputum	  Cardiovascular:  [ ] chest pain [ ] palpitations [ ] edema	  Gastrointestinal:  [ ] nausea [ ] vomiting [ ] diarrhea [ ] constipation [ ] pain	  Genitourinary:  [ ] dysuria [ ] frequency [ ] hematuria [ ] discharge [x ] flank pain  [ ] incontinence  Musculoskeletal:  [ ] myalgias [ ] arthralgias [ ] arthritis  [ ] back pain  Neurological:  [ ] headache [ ] seizures  [ ] confusion/altered mental status  Psychiatric:  [ ] anxiety [ ] depression	  Hematology/Lymphatics:  [ ] lymphadenopathy  Endocrine:  [ ] adrenal [ ] thyroid  Allergic/Immunologic:	 [ ] transplant [ ] seasonal    Vital Signs Last 24 Hrs  T(F): 97.7 (08-25-21 @ 09:34), Max: 99 (08-24-21 @ 22:13)    Vital Signs Last 24 Hrs  HR: 95 (08-25-21 @ 09:34) (90 - 108)  BP: 118/71 (08-25-21 @ 09:34) (115/77 - 128/80)  RR: 18 (08-25-21 @ 09:34)  SpO2: 95% (08-25-21 @ 09:34) (95% - 99%)  Wt(kg): --    PHYSICAL EXAM:  Constitutional: non-toxic, no distress  HEAD/EYES: anicteric, no conjunctival injection  ENT:  supple, no thrush  Cardiovascular:   normal S1, S2, no murmur, no edema  Respiratory:  clear BS bilaterally, no wheezes, no rales  GI:  soft, non-tender, normal bowel sounds, Erythema present across the whole abdomen, that is warm to touch  :  no urbano, no CVA tenderness  Musculoskeletal:  no synovitis, normal ROM  Neurologic: awake and alert, normal strength, no focal findings  Skin:  no rash, no erythema, no phlebitis  Heme/Onc: no lymphadenopathy   Psychiatric:  awake, alert, appropriate mood          WBC Count: 11.64 K/uL (08-24 @ 23:15)                            13.9   11.64 )-----------( 185      ( 24 Aug 2021 23:15 )             42.3       08-24    139  |  108  |  14  ----------------------------<  107<H>  4.2   |  25  |  1.52<H>    Ca    9.1      24 Aug 2021 23:15    TPro  8.0  /  Alb  3.6  /  TBili  2.0<H>  /  DBili  x   /  AST  18  /  ALT  20  /  AlkPhos  38<L>  08-24      Creatinine Trend: 1.52<--        MICROBIOLOGY:    Procalcitonin, Serum: 3.65 (08-25-21 @ 09:46)      RADIOLOGY:    EXAM: CT ABDOMEN AND PELVIS IC      PROCEDURE DATE: 08/25/2021        INTERPRETATION: CLINICAL INFORMATION: Abdominal wall swelling and erythema.    COMPARISON: None.    CONTRAST/COMPLICATIONS:  IV Contrast: Omnipaque 350 90 cc administered 10 cc discarded  Oral Contrast: NONE  Complications: None reported at time of study completion    PROCEDURE:  CT of the Abdomen and Pelvis was performed.  Sagittal and coronal reformats were performed.    FINDINGS:  LOWER CHEST: Within normal limits.    LIVER: Segment 8/5 cyst.  BILE DUCTS: Normal caliber.  GALLBLADDER: Within normal limits.  SPLEEN: Within normal limits.  PANCREAS: Within normal limits.  ADRENALS: Within normal limits.  KIDNEYS/URETERS: Within normal limits.    BLADDER: Within normal limits.  REPRODUCTIVE ORGANS: Prostate within normal limits.    BOWEL: No bowel obstruction. Appendix is normal.  PERITONEUM: No ascites.  VESSELS: Within normal limits.  RETROPERITONEUM/LYMPH NODES: No lymphadenopathy.  ABDOMINAL WALL: Anterior abdominal wall subcutaneous edema. Linear opacities along the flanks bilaterally may represent scarring. There is skin thickening over the left flank. No subcutaneous gas or fluid collection.  BONES: Degenerative changes.    IMPRESSION:  Mild ventral abdominal wall edema with right ventral abdominal wall skin thickening and left flank Please correlate for signs and symptoms of cellulitis.        --- End of Report ---

## 2021-08-25 NOTE — H&P ADULT - NSHPPHYSICALEXAM_GEN_ALL_CORE
Physical exam:  General: patient in no acute distress, resting comfortably  Head:  Atraumatic, Normocephalic  Eyes: EOMI, PERRLA, clear sclera  Neck: Supple, thyroid nontender, non enlarged  Cardio: S1/S2 +ve, regular rate and rhythm, no M/G/R  Resp: clear to ausculation bilaterally, no rales or wheezes  GI: abdomen soft, nontender, non distended, no guarding, BS +ve x 4  Ext: no significant pedal edema  Neuro: CN 2-12 intact, no significant motor or sensory deficits.  Skin: large areas of erythema of warmth, upper abdomen/chest and lower abdomen radiating to L flank.  No pain on palpation, no opens lesions.

## 2021-08-25 NOTE — H&P ADULT - NSHPREVIEWOFSYSTEMS_GEN_ALL_CORE
Constitutional: chills positive.  No night sweats  Ears: no hearing changes or ear pain,   Nose: no nasal congestion, sinus pain, or rhinorrhea  Cardio: no chest pain, orthopnea, edema, or palpitations  Resp: no dyspnea, cough, wheezing  GI: no nausea, vomiting, diarrhea, constipation, hematochezia, or melena  : no dysuria, urinary frequency, hematuria  MSK: no back pain, neck pain  Skin: no rash, pruritis   Neuro: no weakness, dizziness, lightheadedness, syncope   Heme/Lymph: no bruising or bleeding

## 2021-08-25 NOTE — ED PROVIDER NOTE - CLINICAL SUMMARY MEDICAL DECISION MAKING FREE TEXT BOX
Pt p/w diffuse large abdominal wall and left flank erythema, warmth, swelling  1days. Sensitivity/pain to light touch around the erythematous area. No lymphangitic spread visible and no fluid pockets or fluctuance   low suspicion for abscess. Low suspicion for osteomyelitis or DVTs. Not immunocompromised, no bullae, no discoloration, no foul odor, no crepitus at site, no pain out of proportion,   - possible nec fasc due to fast spread over 1 day?  PLAN:  - vanco/cefepime; sepsis labs, unclear if truly cellulitis  - Wound check w/ PMD or here in ED in 48 hours.   Disposition: Admit; Non-toxic appearing, hemodynamically stable. Low risk for treatment failure based on (-) fever, (-) chronic leg ulcers, (-) chronic edema/lymphedema, (+) prior cellulitis in same area, (-) cellulitis at wound site.

## 2021-08-25 NOTE — H&P ADULT - NSHPLABSRESULTS_GEN_ALL_CORE
Recent Vitals  T(C): 36.7 (08-25-21 @ 02:19), Max: 37.2 (08-24-21 @ 22:13)  HR: 90 (08-25-21 @ 02:19) (90 - 108)  BP: 118/75 (08-25-21 @ 02:19) (115/77 - 118/75)  RR: 18 (08-25-21 @ 02:19) (15 - 32)  SpO2: 99% (08-25-21 @ 02:19) (96% - 99%)                        13.9   11.64 )-----------( 185      ( 24 Aug 2021 23:15 )             42.3     08-24    139  |  108  |  14  ----------------------------<  107<H>  4.2   |  25  |  1.52<H>    Ca    9.1      24 Aug 2021 23:15    TPro  8.0  /  Alb  3.6  /  TBili  2.0<H>  /  DBili  x   /  AST  18  /  ALT  20  /  AlkPhos  38<L>  08-24    PT/INR - ( 25 Aug 2021 00:22 )   PT: 12.6 sec;   INR: 1.09 ratio         PTT - ( 25 Aug 2021 00:22 )  PTT:28.9 sec  LIVER FUNCTIONS - ( 24 Aug 2021 23:15 )  Alb: 3.6 g/dL / Pro: 8.0 gm/dL / ALK PHOS: 38 U/L / ALT: 20 U/L / AST: 18 U/L / GGT: x               Home Medications:  enoxaparin 30 mg/0.3 mL injectable solution:  (09 Mar 2020 14:36)

## 2021-08-25 NOTE — H&P ADULT - HISTORY OF PRESENT ILLNESS
Patient is a 61M with a PMH of HTN, HLD who presents to the ED for rash on his abdomen.  Patient states the was in his usual state of health until yesterday afternoon when he had subjective chills and noted increased erythema and warmth on his upper and lower abdomen radiating to his L flank.  Patient has no other complaints.  States that he had a similar rash early 2021 which he got treated at [facility].  Denies allergies, denies pruritis.  Otherwise, patient has no other complaints.  Reports getting plastic surgery on his chest last year.  Vitals stable, labs show mild leukocytosis and elevated creatinine.

## 2021-08-25 NOTE — ED PROVIDER NOTE - OBJECTIVE STATEMENT
61M PMHx of obesity, HTN presenting with diffuse abdominal rash x 1 day. Woke up this AM, began having abdominal and flank pain b/l, mild a/w swelling and redness. Of note, had similar rash in past, 1 month ago, treated at [facility] in [place] for cellulitis and discharged home with improvement. Denies any chest pain, shortness of breath, headaches, neck pain, neck stiffness, fevers, chills, falls, trauma, syncope, new medications, herbal supplements, boric acid use, diarrhea, constipation, travel hx, tick bites.

## 2021-08-25 NOTE — CONSULT NOTE ADULT - ASSESSMENT
Assessment  61 yr old male presents with a rash across his abdomen x 1 day with chills and achiness. Pt had a liposuction procedure a year prior and has had cellulitis just like this after the procedure. He also once had cellulitis after a plate was placed into his ankle for a compound fracture. Wbc is slightly elevated, procal is 3.65 and creatnine is 1.52 Assessment  61 yr old male presents with a rash across his abdomen x 1 day with chills and achiness. Pt had a liposuction procedure a year prior and has had cellulitis just like this after the procedure. He also once had cellulitis after a plate was placed into his ankle for a compound fracture. Wbc is slightly elevated, procal is 3.65 and creatnine is 1.52    Plan  Cellulitis- Switch abx to Ceazolin 2000mg every 8 hours, draw labs for HIV, G/C, and Syphillis     Elevated Creatnine- Give IV Fluids and trend it to see if it improves      Assessment  61 yr old male presents with a rash across his abdomen x 1 day with chills and achiness. Pt had a liposuction procedure a year prior and has had cellulitis just like this after the procedure. He also once had cellulitis after a plate was placed into his ankle for a compound fracture. Wbc is slightly elevated, procal is 3.65 and creatnine is 1.52    Plan  Cellulitis- Switch abx to Ceazolin 2000mg every 8 hours, draw labs for HIV, G/C, and Syphillis     Elevated Creatnine- Give IV Fluids and trend

## 2021-08-25 NOTE — ED PROVIDER NOTE - PROGRESS NOTE DETAILS
CT showing cellulitis but no free air or sub cut air. will admit for IV abx and ID consult for etiology, no obvious trigger/wound on abdomen.

## 2021-08-25 NOTE — H&P ADULT - PROBLEM SELECTOR PLAN 1
Started on Cefepime and Vanco in the ED, will continue cefepime.  Follow blood cultures - de-escalate antibiotics as needed   ID consult in [facility]

## 2021-08-25 NOTE — CHART NOTE - NSCHARTNOTEFT_GEN_A_CORE
chart  reviewed    61 yr old male       presents with a rash across his abdomen x 1 day with chills and achiness.       Pt had a liposuction procedure a year prior and   had cellulitis . similar to this  after the procedure.    seen by ID, on iv  cefepime/  cellulitis     AUBRIE,  on iv fluids  HTN. on  norvasc/ cozaar    CT  a/P,   with abd wall  skin thickening     RAD< from: CT Abdomen and Pelvis w/ IV Cont (08.25.21 @ 03:10) >  IMPRESSION:  Mild ventral abdominal wall edema with right ventral abdominal wall skin thickening and left flank Please correlate for signs and symptoms of cellulitis.  --- End of Report ---  < end of copied text >

## 2021-08-25 NOTE — ED PROVIDER NOTE - PHYSICAL EXAMINATION
VITAL SIGNS: I have reviewed nursing notes and confirm.   GEN: Well-developed; well-nourished; in no acute distress. Speaking full sentences.  SKIN: Warm, pink, no rash, no diaphoresis, no cyanosis, well perfused.   HEAD: Normocephalic; atraumatic. No scalp lacerations, no abrasions.  NECK: Supple; non tender.   EYES: Pupils 3mm equal, round, reactive to light and accomodation, conjunctiva and sclera clear. Extra-ocular movements intact bilaterally.  ENT: No nasal discharge; airway clear. Trachea is midline. ORAL: No oropharyngeal exudates or erythema. Normal dentition.  CV: Regular rate and rhythm. S1, S2 normal; no murmurs, gallops, or rubs. No lower extremity pitting edema bilaterally. Capillary refill < 2 seconds throughout. Distal pulses intact 2+ throughout.  RESP: CTA bilaterally. No wheezes, rales, or rhonchi.   ABD: Normal bowel sounds, soft, non-distended, non-tender, no hepatosplenomegaly. No CVA tenderness bilaterally. (+) Extensive erythematous, blanchable rash anterior lower abdomen, and left flank, mild ttp, mild swelling, no crepitus, no guarding/no rebound/no rigidity.  MSK: Normal range of motion and movement of all 4 extremities. No joint or muscular pain throughout. No clubbing.   BACK: No thoracolumbar midline or paravertebral tenderness. No step-offs or obvious deformities.  NEURO: Alert & oriented x 3, Grossly unremarkable. Sensory and motor intact throughout. No focal deficits. Gait: Fluid. Normal speech and coordination.   PSYCH: Cooperative, appropriate.

## 2021-08-25 NOTE — CONSULT NOTE ADULT - ATTENDING COMMENTS
patient has abdominal wall cellulitis  alternative dx if syphilis but weird presentation   no known tick exposure  lyme negative  change antibiotics to cefazolin   cultures  STD screening     Michael Plasencia DO  Infectious Disease Attending  Pager [phone]  After 5pm/weekends please call [phone] for all inquiries and new consults

## 2021-08-25 NOTE — ED PROVIDER NOTE - IV ALTEPLASE EXCL ABS HIDDEN
----- Message from Ken Johnson DO sent at 10/6/2019  7:42 PM CDT -----  Copy for exam  
Labs printed for upcoming appointment placed in folder on Dr. paulino's desk   
show

## 2021-08-26 VITALS
OXYGEN SATURATION: 98 % | DIASTOLIC BLOOD PRESSURE: 79 MMHG | TEMPERATURE: 98 F | HEART RATE: 73 BPM | RESPIRATION RATE: 18 BRPM | SYSTOLIC BLOOD PRESSURE: 129 MMHG

## 2021-08-26 LAB
ANA TITR SER: NEGATIVE — SIGNIFICANT CHANGE UP
ANION GAP SERPL CALC-SCNC: 7 MMOL/L — SIGNIFICANT CHANGE UP (ref 5–17)
BUN SERPL-MCNC: 10 MG/DL — SIGNIFICANT CHANGE UP (ref 7–23)
CALCIUM SERPL-MCNC: 8.4 MG/DL — LOW (ref 8.5–10.1)
CHLORIDE SERPL-SCNC: 110 MMOL/L — HIGH (ref 96–108)
CO2 SERPL-SCNC: 23 MMOL/L — SIGNIFICANT CHANGE UP (ref 22–31)
COVID-19 SPIKE DOMAIN AB INTERP: NEGATIVE — SIGNIFICANT CHANGE UP
COVID-19 SPIKE DOMAIN ANTIBODY RESULT: 0.4 U/ML — SIGNIFICANT CHANGE UP
CREAT SERPL-MCNC: 1.19 MG/DL — SIGNIFICANT CHANGE UP (ref 0.5–1.3)
CRP SERPL-MCNC: 54 MG/L — HIGH
GLUCOSE SERPL-MCNC: 85 MG/DL — SIGNIFICANT CHANGE UP (ref 70–99)
HCV AB S/CO SERPL IA: 0.08 S/CO — SIGNIFICANT CHANGE UP (ref 0–0.99)
HCV AB SERPL-IMP: SIGNIFICANT CHANGE UP
HIV 1+2 AB+HIV1 P24 AG SERPL QL IA: SIGNIFICANT CHANGE UP
POTASSIUM SERPL-MCNC: 3.6 MMOL/L — SIGNIFICANT CHANGE UP (ref 3.5–5.3)
POTASSIUM SERPL-SCNC: 3.6 MMOL/L — SIGNIFICANT CHANGE UP (ref 3.5–5.3)
SARS-COV-2 IGG+IGM SERPL QL IA: 0.4 U/ML — SIGNIFICANT CHANGE UP
SARS-COV-2 IGG+IGM SERPL QL IA: NEGATIVE — SIGNIFICANT CHANGE UP
SODIUM SERPL-SCNC: 140 MMOL/L — SIGNIFICANT CHANGE UP (ref 135–145)
T PALLIDUM AB TITR SER: NEGATIVE — SIGNIFICANT CHANGE UP

## 2021-08-26 PROCEDURE — 99232 SBSQ HOSP IP/OBS MODERATE 35: CPT

## 2021-08-26 PROCEDURE — 93010 ELECTROCARDIOGRAM REPORT: CPT

## 2021-08-26 PROCEDURE — 99239 HOSP IP/OBS DSCHRG MGMT >30: CPT

## 2021-08-26 RX ORDER — ENOXAPARIN SODIUM 100 MG/ML
0 INJECTION SUBCUTANEOUS
Qty: 0 | Refills: 0 | DISCHARGE

## 2021-08-26 RX ORDER — CEPHALEXIN 500 MG
1 CAPSULE ORAL
Qty: 40 | Refills: 0
Start: 2021-08-26 | End: 2021-09-04

## 2021-08-26 RX ORDER — ATORVASTATIN CALCIUM 80 MG/1
1 TABLET, FILM COATED ORAL
Qty: 0 | Refills: 0 | DISCHARGE
Start: 2021-08-26

## 2021-08-26 RX ORDER — LOSARTAN POTASSIUM 100 MG/1
1 TABLET, FILM COATED ORAL
Qty: 0 | Refills: 0 | DISCHARGE
Start: 2021-08-26

## 2021-08-26 RX ORDER — AMLODIPINE BESYLATE 2.5 MG/1
1 TABLET ORAL
Qty: 0 | Refills: 0 | DISCHARGE
Start: 2021-08-26

## 2021-08-26 RX ADMIN — ENOXAPARIN SODIUM 40 MILLIGRAM(S): 100 INJECTION SUBCUTANEOUS at 11:54

## 2021-08-26 RX ADMIN — SODIUM CHLORIDE 60 MILLILITER(S): 9 INJECTION INTRAMUSCULAR; INTRAVENOUS; SUBCUTANEOUS at 05:20

## 2021-08-26 RX ADMIN — AMLODIPINE BESYLATE 10 MILLIGRAM(S): 2.5 TABLET ORAL at 05:19

## 2021-08-26 RX ADMIN — Medication 100 MILLIGRAM(S): at 14:43

## 2021-08-26 RX ADMIN — LOSARTAN POTASSIUM 50 MILLIGRAM(S): 100 TABLET, FILM COATED ORAL at 05:19

## 2021-08-26 RX ADMIN — Medication 100 MILLIGRAM(S): at 05:19

## 2021-08-26 NOTE — DISCHARGE NOTE PROVIDER - NSDCMRMEDTOKEN_GEN_ALL_CORE_FT
amLODIPine 10 mg oral tablet: 1 tab(s) orally once a day  atorvastatin 40 mg oral tablet: 1 tab(s) orally once a day (at bedtime)  cephalexin 500 mg oral capsule: 1 cap(s) orally 4 times a day   losartan 50 mg oral tablet: 1 tab(s) orally once a day

## 2021-08-26 NOTE — DISCHARGE NOTE NURSING/CASE MANAGEMENT/SOCIAL WORK - NSPROEXTENSIONSOFSELF_GEN_A_NUR
none

## 2021-08-26 NOTE — PROGRESS NOTE ADULT - ASSESSMENT
full note to follow  no objections to discharge home on Keflex 500 mg po x four times a day x 10 days  cellulitis is better, cr improved  discussed with Dr. Plasencia and Dr. Alaniz  61 yr old male presents with a rash across his abdomen x 1 day with chills and achiness. Pt had a liposuction procedure a year prior and has had cellulitis just like this after the procedure. He also once had cellulitis after a plate was placed into his ankle for a compound fracture. Wbc is slightly elevated, procal is 3.65 and creatnine is 1.52  Attending addendum:  patient has abdominal wall cellulitis  alternative dx if syphilis but weird presentation   no known tick exposure  lyme negative  change antibiotics to cefazolin   cultures  STD screening     8/26: cellulitis is improving, no fevers since yesterday evening, blood cultures with no growth to date, STD screening in progress, Cr normalized. No objections for discharge home on po antibiotics.       Suggestions:  no objections to discharge home on Keflex 500 mg po x four times a day x 10 days  follow up with Dr. Plasencia  [address] [place] [address]  t. [phone]  discussed with Dr. Plasencia and Dr. Alaniz

## 2021-08-26 NOTE — DISCHARGE NOTE PROVIDER - HOSPITAL COURSE
61M with a PMH of HTN, HLD who presents to the ED for rash on his abdomen.  Patient states the was in his usual state of health until yesterday afternoon when he had subjective chills and noted increased erythema and warmth on his upper and lower abdomen radiating to his L flank.  Patient has no other complaints.  States that he had a similar rash early 2021 which he got treated at [facility].  Denies allergies, denies pruritis.  Otherwise, patient has no other complaints.  Reports getting plastic surgery on his chest last year.  Vitals stable, labs show mild leukocytosis and elevated creatinine.        Problem/Plan - 1:  ·  Problem: Cellulitis.   ·  Plan: d/w id change to po abx     Problem/Plan - 2:  ·  Problem: AUBRIE (acute kidney injury).   ·  Plan: Cr 1.5 from 1.1       Problem/Plan - 3:  ·  Problem: Essential hypertension.   ·  Plan: amlodipine, losartan.    Problem/Plan - 4:  ·  Problem: Hyperlipidemia.   ·  Plan: lipitor.         pt seen and examined 45 min spent on dc planning     Lab test review, Radiology Review, Vitals review, Consultant review and discussion, Physical examination, IDR, Assessment and plan; Plan discussion with patient and family

## 2021-08-26 NOTE — PROGRESS NOTE ADULT - SUBJECTIVE AND OBJECTIVE BOX
BARRETTYANELI WAITE  MRN-[id number]    Follow Up:  cellulitis     Interval History: The pt was seen and examined earlier, in bed, comfortable, states that he is feeling much better and would like to go home. The pt is afebrile today, had a low grade temp yesterday evening 100.3, no new cbc, Cr normalized.     PAST MEDICAL & SURGICAL HISTORY:  HTN (hypertension)    No significant past surgical history        ROS:    [ ] Unobtainable because:  [x ] All other systems negative    Constitutional: no fever, no chills  Head: no trauma  Eyes: no vision changes, no eye pain  ENT:  no sore throat, no rhinorrhea  Cardiovascular:  no chest pain, no palpitation  Respiratory:  no SOB, no cough  GI:  no abd pain, no vomiting, no diarrhea  urinary: no dysuria, no hematuria, no flank pain  musculoskeletal:  no joint pain, no joint swelling  skin:  cellulitis is improving   neurology:  no headache, no seizure, no change in mental status  psych: no anxiety, no depression         Allergies  No Known Allergies        ANTIMICROBIALS:  ceFAZolin   IVPB 2000 every 8 hours      OTHER MEDS:  acetaminophen   Tablet .. 650 milliGRAM(s) Oral every 4 hours PRN  amLODIPine   Tablet 10 milliGRAM(s) Oral daily  atorvastatin 40 milliGRAM(s) Oral at bedtime  enoxaparin Injectable 40 milliGRAM(s) SubCutaneous daily  losartan 50 milliGRAM(s) Oral daily  sodium chloride 0.9%. 1000 milliLiter(s) IV Continuous <Continuous>      Vital Signs Last 24 Hrs  T(C): 36.7 (26 Aug 2021 11:38), Max: 37.9 (25 Aug 2021 17:02)  T(F): 98.1 (26 Aug 2021 11:38), Max: 100.3 (25 Aug 2021 17:02)  HR: 73 (26 Aug 2021 11:38) (73 - 93)  BP: 129/79 (26 Aug 2021 11:38) (101/59 - 129/79)  BP(mean): --  RR: 18 (26 Aug 2021 11:38) (17 - 18)  SpO2: 98% (26 Aug 2021 11:38) (98% - 100%)    Physical Exam:  Constitutional: non-toxic, no distress  HEAD/EYES: anicteric, no conjunctival injection  ENT:  supple, no thrush  Cardiovascular:   normal S1, S2, no murmur, no edema  Respiratory:  clear BS bilaterally, no wheezes, no rales  GI:  soft, non-tender, normal bowel sounds, no distention   :  no urbano, no CVA tenderness  Musculoskeletal:  no synovitis, normal ROM  Neurologic: awake and alert, normal strength, no focal findings  Skin:  no rash, no phlebitis, abdominal wall cellulitis is improving, still has erythema but better, not hot to touch, not tender, no swelling.   Heme/Onc: no lymphadenopathy   Psychiatric:  awake, alert, appropriate mood    WBC Count: 11.64 K/uL (08-24 @ 23:15)                            13.9   11.64 )-----------( 185      ( 24 Aug 2021 23:15 )             42.3       08-26    140  |  110<H>  |  10  ----------------------------<  85  3.6   |  23  |  1.19    Ca    8.4<L>      26 Aug 2021 06:57    TPro  8.0  /  Alb  3.6  /  TBili  2.0<H>  /  DBili  x   /  AST  18  /  ALT  20  /  AlkPhos  38<L>  08-24          Creatinine Trend: 1.19<--, 1.52<--  Lactate, Blood: 1.7 mmol/L (08-24-21 @ 23:15)      MICROBIOLOGY:  v  .Blood Blood-Peripheral  08-25-21   No growth to date.  --  --    C-Reactive Protein, Serum: 54 (08-25)          Procalcitonin, Serum: 3.65 (08-25-21 @ 09:46)      RADIOLOGY:

## 2021-08-26 NOTE — DISCHARGE NOTE NURSING/CASE MANAGEMENT/SOCIAL WORK - NSDCPEFALRISK_GEN_ALL_CORE
For information on Fall & injury Prevention, visit [email or web address]

## 2021-08-26 NOTE — DISCHARGE NOTE NURSING/CASE MANAGEMENT/SOCIAL WORK - PATIENT PORTAL LINK FT
You can access the FollowMyHealth Patient Portal offered by [facility] by registering at the following website: [email or web address]. By joining [employer]’s FollowMyHealth portal, you will also be able to view your health information using other applications (apps) compatible with our system.

## 2021-08-26 NOTE — DISCHARGE NOTE PROVIDER - NSDCCPCAREPLAN_GEN_ALL_CORE_FT
PRINCIPAL DISCHARGE DIAGNOSIS  Diagnosis: Cellulitis  Assessment and Plan of Treatment: finish 10 more days of antibiotics, follow with your primary doctor

## 2021-08-30 LAB
B BURGDOR DNA SPEC QL NAA+PROBE: NEGATIVE — SIGNIFICANT CHANGE UP
CULTURE RESULTS: SIGNIFICANT CHANGE UP
CULTURE RESULTS: SIGNIFICANT CHANGE UP
SPECIMEN SOURCE: SIGNIFICANT CHANGE UP
SPECIMEN SOURCE: SIGNIFICANT CHANGE UP

## 2021-09-01 DIAGNOSIS — E78.5 HYPERLIPIDEMIA, UNSPECIFIED: ICD-10-CM

## 2021-09-01 DIAGNOSIS — L03.311 CELLULITIS OF ABDOMINAL WALL: ICD-10-CM

## 2021-09-01 DIAGNOSIS — D72.829 ELEVATED WHITE BLOOD CELL COUNT, UNSPECIFIED: ICD-10-CM

## 2021-09-01 DIAGNOSIS — N17.9 ACUTE KIDNEY FAILURE, UNSPECIFIED: ICD-10-CM

## 2021-09-01 DIAGNOSIS — I10 ESSENTIAL (PRIMARY) HYPERTENSION: ICD-10-CM

## 2022-04-03 NOTE — ED PROVIDER NOTE - NS ED MD DISPO SPECIAL CONSIDERATION1
Arrives via EMS from home with complaints of nausea, vomiting and RUQ abdominal pain x3 days. Unable to keep food, liquids or pills down during this time. Denies diarrhea. In January had perforated duodenal ulcer, 1/2 foot intestine removed. Given 100 mcg IV fentanyl, 4mg SL zofran, 50 mg IV benadryl via EMS. x2 emesis at scene. Wheelchair bound at baseline and chronic suprapubic catheter. ABC's intact.  
No
Fall Precaution

## 2022-04-06 NOTE — ED PROVIDER NOTE - CPE EDP GASTRO NORM
Tdap; 31-Dec-2020 22:32; Miracle Barnett (CADEN); [employer]; [id number] (Exp. Date: 21-Jul-2022); IntraMuscular; Deltoid Right.; 0.5 milliLiter(s); VIS (VIS Published: 09-May-2013, VIS Presented: 31-Dec-2020);    normal...

## 2024-06-14 NOTE — ED ADULT NURSE NOTE - CHPI ED NUR AGGRAVATING FX
Rosario Easley  
none

## 2024-07-31 NOTE — ED ADULT NURSE NOTE - CAS DISCH ACCOMP BY
Rx Refill Request Telephone Encounter    Name:  Shai Carlson  :  [id number]  Medication Name:      fluticasone (Flonase) 50 mcg/actuation nasal spray   Route: Administer 2 sprays into each nostril once daily.     sertraline (Zoloft) 100 mg tablet   Route: Take 1.5 tablets (150 mg) by mouth once daily.     Specific Pharmacy location:  [facility] - [place] [address] [address]   Date of last appointment:  24  Date of next appointment:  24  Best number to reach patient:  [phone]            
Spouse